# Patient Record
Sex: FEMALE | Race: WHITE
[De-identification: names, ages, dates, MRNs, and addresses within clinical notes are randomized per-mention and may not be internally consistent; named-entity substitution may affect disease eponyms.]

---

## 2017-06-21 ENCOUNTER — HOSPITAL ENCOUNTER (EMERGENCY)
Dept: HOSPITAL 47 - EC | Age: 27
Discharge: HOME | End: 2017-06-21
Payer: COMMERCIAL

## 2017-06-21 VITALS — TEMPERATURE: 98.6 F | SYSTOLIC BLOOD PRESSURE: 107 MMHG | DIASTOLIC BLOOD PRESSURE: 65 MMHG | HEART RATE: 89 BPM

## 2017-06-21 VITALS — RESPIRATION RATE: 16 BRPM

## 2017-06-21 DIAGNOSIS — S10.91XA: ICD-10-CM

## 2017-06-21 DIAGNOSIS — S01.01XA: Primary | ICD-10-CM

## 2017-06-21 DIAGNOSIS — S01.81XA: ICD-10-CM

## 2017-06-21 DIAGNOSIS — Z23: ICD-10-CM

## 2017-06-21 DIAGNOSIS — S80.11XA: ICD-10-CM

## 2017-06-21 DIAGNOSIS — F17.200: ICD-10-CM

## 2017-06-21 DIAGNOSIS — S80.12XA: ICD-10-CM
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
## 2017-06-21 DIAGNOSIS — S40.012A: ICD-10-CM

## 2017-06-21 DIAGNOSIS — S20.311A: ICD-10-CM
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
## 2017-06-21 PROCEDURE — 99285 EMERGENCY DEPT VISIT HI MDM: CPT

## 2017-06-21 PROCEDURE — 70450 CT HEAD/BRAIN W/O DYE: CPT

## 2017-06-21 PROCEDURE — 72125 CT NECK SPINE W/O DYE: CPT

## 2017-06-21 PROCEDURE — 90715 TDAP VACCINE 7 YRS/> IM: CPT

## 2017-06-21 PROCEDURE — 12002 RPR S/N/AX/GEN/TRNK2.6-7.5CM: CPT

## 2017-06-21 PROCEDURE — 90471 IMMUNIZATION ADMIN: CPT

## 2017-06-21 RX ADMIN — Medication ONE: at 03:53

## 2017-06-21 RX ADMIN — Medication ONE ML: at 03:50

## 2017-06-21 RX ADMIN — ACETAMINOPHEN STA MG: 500 TABLET ORAL at 03:38

## 2017-06-21 RX ADMIN — TETANUS TOXOID, REDUCED DIPHTHERIA TOXOID AND ACELLULAR PERTUSSIS VACCINE, ADSORBED ONE ML: 5; 2.5; 8; 8; 2.5 SUSPENSION INTRAMUSCULAR at 03:43

## 2017-06-21 NOTE — CT
EXAM:

  CT Head Without Intravenous Contrast

 

CLINICAL HISTORY:

  Reason: Pain

 

TECHNIQUE:

  Axial computed tomography images of the head/brain without intravenous 

contrast.  CTDI is 57.4 mGy and DLP is 961 mGy-cm.  This CT exam was 

performed using one or more of the following dose reduction techniques: 

automated exposure control, adjustment of the mA and/or kV according to 

patient size, and/or use of iterative reconstruction technique.

 

COMPARISON:

  CT 5/28/2016.

 

FINDINGS:

  Brain:  No hemorrhage.  No acute cortical infarct.  No mass effect or 

midline shift.

  Ventricles:  Unremarkable.

  Bones/joints:  No acute fracture.

  Soft tissues:  Scalp soft tissue swelling.

  Sinuses:  Mild sinus disease.

  Mastoid air cells:  Unremarkable as visualized.

 

IMPRESSION:     

1.  No intracranial hemorrhage or skull fracture.

2.  Scalp soft tissue swelling.  

 

_______________________________________________

 

EXAM:

  CT Cervical Spine Without Intravenous Contrast

 

CLINICAL HISTORY:

  Reason: Pain

 

TECHNIQUE:

  Axial computed tomography images of the cervical spine without 

intravenous contrast.  CTDI is 13.9 mGy and DLP is 316.9 mGy-cm.  This CT 

exam was performed using one or more of the following dose reduction 

techniques: automated exposure control, adjustment of the mA and/or kV 

according to patient size, and/or use of iterative reconstruction 

technique.

 

COMPARISON:

  CT 5/28/2016.

 

FINDINGS:

  Vertebrae:  No acute fracture.  Slight reversal of the normal cervical 

lordosis.

  Discs/spinal canal/neural foramina:   No critical spinal canal stenosis.

 

  Soft tissues:  Unremarkable.

  Lung apices:  Unremarkable as visualized.

 

IMPRESSION:     

  No acute fracture.
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
## 2018-09-06 ENCOUNTER — HOSPITAL ENCOUNTER (EMERGENCY)
Dept: HOSPITAL 47 - EC | Age: 28
Discharge: HOME | End: 2018-09-06
Payer: OTHER GOVERNMENT

## 2018-09-06 VITALS
DIASTOLIC BLOOD PRESSURE: 73 MMHG | SYSTOLIC BLOOD PRESSURE: 118 MMHG | HEART RATE: 64 BPM | TEMPERATURE: 98.5 F | RESPIRATION RATE: 18 BRPM

## 2018-09-06 DIAGNOSIS — F10.120: Primary | ICD-10-CM

## 2018-09-06 DIAGNOSIS — F17.200: ICD-10-CM

## 2018-09-06 DIAGNOSIS — S10.91XA: ICD-10-CM
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
## 2018-09-06 PROCEDURE — 72125 CT NECK SPINE W/O DYE: CPT

## 2018-09-06 PROCEDURE — 99285 EMERGENCY DEPT VISIT HI MDM: CPT

## 2018-09-06 PROCEDURE — 80306 DRUG TEST PRSMV INSTRMNT: CPT

## 2018-09-06 PROCEDURE — 82075 ASSAY OF BREATH ETHANOL: CPT
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
## 2019-12-18 ENCOUNTER — HOSPITAL ENCOUNTER (EMERGENCY)
Dept: HOSPITAL 47 - EC | Age: 29
Discharge: HOME | End: 2019-12-18
Payer: COMMERCIAL

## 2019-12-18 VITALS
SYSTOLIC BLOOD PRESSURE: 135 MMHG | HEART RATE: 57 BPM | TEMPERATURE: 97.9 F | RESPIRATION RATE: 18 BRPM | DIASTOLIC BLOOD PRESSURE: 84 MMHG

## 2019-12-18 DIAGNOSIS — S81.852A: Primary | ICD-10-CM

## 2019-12-18 DIAGNOSIS — S81.851A: ICD-10-CM

## 2019-12-18 DIAGNOSIS — F17.200: ICD-10-CM

## 2019-12-18 DIAGNOSIS — Z53.8: ICD-10-CM
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
## 2019-12-18 PROCEDURE — 99283 EMERGENCY DEPT VISIT LOW MDM: CPT

## 2019-12-18 PROCEDURE — 96375 TX/PRO/DX INJ NEW DRUG ADDON: CPT

## 2019-12-18 PROCEDURE — 73590 X-RAY EXAM OF LOWER LEG: CPT

## 2019-12-18 PROCEDURE — 96365 THER/PROPH/DIAG IV INF INIT: CPT

## 2019-12-18 PROCEDURE — 12005 RPR S/N/A/GEN/TRK12.6-20.0CM: CPT

## 2019-12-18 NOTE — XR
EXAMINATION TYPE: XR tibia fibula bilateral

 

DATE OF EXAM: 12/18/2019

 

CLINICAL HISTORY: Dog bite injury with pain.

 

TECHNIQUE:  Two views of the bilateral legs are obtained.

 

COMPARISON: None.

 

FINDINGS: Right leg shows large defect consistent with penetrating or laceration injury level of the 
proximal fibular diaphysis lateral anterior aspect. Mild subcutaneous edema is present bilaterally gr
eater on the left leg laterally. Some penetration or laceration injury to the left leg several levels
 is identified along the lateral and posterior aspect most prominent midshaft level. No radiodense fo
reign body clearly seen bilaterally. Overlying pant material noted at bilateral knees. 

 

IMPRESSION:  There are multiple soft tissue injuries without acute fracture or retained radiodense fo
reign bodies bilaterally.
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
## 2020-11-20 ENCOUNTER — HOSPITAL ENCOUNTER (EMERGENCY)
Dept: HOSPITAL 47 - EC | Age: 30
Discharge: HOME | End: 2020-11-20
Payer: COMMERCIAL

## 2020-11-20 VITALS
TEMPERATURE: 98.5 F | SYSTOLIC BLOOD PRESSURE: 132 MMHG | HEART RATE: 110 BPM | RESPIRATION RATE: 18 BRPM | DIASTOLIC BLOOD PRESSURE: 84 MMHG

## 2020-11-20 DIAGNOSIS — Z20.828: ICD-10-CM

## 2020-11-20 DIAGNOSIS — Z03.818: Primary | ICD-10-CM

## 2020-11-20 DIAGNOSIS — F17.200: ICD-10-CM

## 2020-11-20 PROCEDURE — 99283 EMERGENCY DEPT VISIT LOW MDM: CPT

## 2020-11-20 NOTE — ED
Recheck HPI





- General


Chief Complaint: Recheck/Abnormal Lab/Rx


Stated Complaint: COVID exposure, wants test


Time Seen by Provider: 11/20/20 12:09


Source: patient, RN notes reviewed


Mode of arrival: ambulatory


Limitations: no limitations





- History of Present Illness


Initial Comments: 


30-year-old female presents emergency from requesting Covid testing.  Patient 

states that he works at [employer] states that he was exposed with a known positive.  

Patient states she is asymptomatic.  Denies fevers chills, bodyaches headache 

dizziness GI symptoms.  Patient offers no other complaints.








- Related Data


                                  Previous Rx's











 Medication  Instructions  Recorded


 


Amoxic-Pot Clav 875-125Mg 1 tab PO Q12HR #20 tablet 12/18/19





[Augmentin 875-125]  











                                    Allergies











Allergy/AdvReac Type Severity Reaction Status Date / Time


 


No Known Allergies Allergy   Verified 11/20/20 12:05














Review of Systems


ROS Statement: 


Those systems with pertinent positive or pertinent negative responses have been 

documented in the HPI.





ROS Other: All systems not noted in ROS Statement are negative.





Past Medical History


Past Medical History: No Reported History


History of Any Multi-Drug Resistant Organisms: None Reported


Past Surgical History: No Surgical Hx Reported


Past Psychological History: No Psychological Hx Reported


Smoking Status: Current every day smoker


Past Alcohol Use History: Occasional


Past Drug Use History: Marijuana





General Exam


Limitations: no limitations


General appearance: alert, in no apparent distress


Head exam: Present: atraumatic, normocephalic, normal inspection


Eye exam: Present: normal appearance, PERRL, EOMI.  Absent: scleral icterus, 

conjunctival injection, periorbital swelling


ENT exam: Present: normal exam, normal oropharynx, mucous membranes moist


Neck exam: Present: normal inspection, full ROM.  Absent: tenderness, 

meningismus, lymphadenopathy


Respiratory exam: Present: normal lung sounds bilaterally.  Absent: respiratory 

distress, wheezes, rales, rhonchi, stridor


Cardiovascular Exam: Present: regular rate, normal rhythm, normal heart sounds. 

Absent: systolic murmur, diastolic murmur, rubs, gallop, clicks





Course


                                   Vital Signs











  11/20/20





  12:03


 


Temperature 98.5 F


 


Pulse Rate 110 H


 


Respiratory 18





Rate 


 


Blood Pressure 132/84


 


O2 Sat by Pulse 100





Oximetry 














Medical Decision Making





- Medical Decision Making





Patient is asymptomatic, Covid testing was ordered patient will follow-up on 

results.





Disposition


Clinical Impression: 


 Encounter for laboratory testing for COVID-19 virus





Disposition: HOME SELF-CARE


Condition: Stable


Additional Instructions: 


Please return to the Emergency Department if symptoms worsen or any other 

concerns.


Is patient prescribed a controlled substance at d/c from ED?: No


Referrals: 


None,Stated [Primary Care Provider] - 1-2 days


Time of Disposition: 12:28